# Patient Record
Sex: MALE
[De-identification: names, ages, dates, MRNs, and addresses within clinical notes are randomized per-mention and may not be internally consistent; named-entity substitution may affect disease eponyms.]

---

## 2018-12-27 ENCOUNTER — HOSPITAL ENCOUNTER (EMERGENCY)
Dept: HOSPITAL 14 - H.ER | Age: 28
Discharge: HOME | End: 2018-12-27
Payer: COMMERCIAL

## 2018-12-27 VITALS — HEART RATE: 62 BPM | RESPIRATION RATE: 16 BRPM | DIASTOLIC BLOOD PRESSURE: 72 MMHG | SYSTOLIC BLOOD PRESSURE: 116 MMHG

## 2018-12-27 VITALS — TEMPERATURE: 98.4 F

## 2018-12-27 VITALS — BODY MASS INDEX: 25.7 KG/M2

## 2018-12-27 DIAGNOSIS — B00.9: Primary | ICD-10-CM

## 2018-12-27 NOTE — ED PDOC
HPI: Skin/Bite Injury


Time Seen by Provider: 18 09:09


Chief Complaint (Nursing): Abnormal Skin Integrity


Chief Complaint (Provider): Rash


History Per: Patient


History/Exam Limitations: no limitations


Quality Of Symptoms: Painful, Itching, Draining


Additional Complaint(s): 





Pt reports itchy/painful rash in between buttocks X 2 weeks, feels like a ball 

then opens when he scratches.  States girlfriend was dx with possible herpes 

recently.  Denies fever.





Past Medical History


Reviewed: Nursing Documentation, Vital Signs


Vital Signs: 





                                Last Vital Signs











Temp  98.4 F   18 08:44


 


Pulse  59 L  18 08:44


 


Resp  17   18 08:44


 


BP  113/74   18 08:44


 


Pulse Ox  99   18 08:44














- Medical History


PMH: No Chronic Diseases





- Family History


Family History: States: Unknown Family Hx





- Living Arrangements


Living Arrangements: Alone





- Social History


Current smoker - smoking cessation education provided: No


Alcohol: None





- Immunization History


Hx Tetanus Toxoid Vaccination: No


Hx Influenza Vaccination: No


Hx Pneumococcal Vaccination: No





- Home Medications


Home Medications: 


                                Ambulatory Orders











 Medication  Instructions  Recorded


 


Acyclovir [Zovirax] 400 mg PO TID #21 tab 18














- Allergies


Allergies/Adverse Reactions: 


                                    Allergies











Allergy/AdvReac Type Severity Reaction Status Date / Time


 


No Known Allergies Allergy   Verified 18 09:11














Review of Systems


Constitutional: Negative for: Fever, Chills


Respiratory: Negative for: Cough


Gastrointestinal: Negative for: Abdominal Pain


Genitourinary Male: Positive for: Rash.  Negative for: Dysuria, Frequency, 

Incontinence, Hematuria, Penile Discharge, Scrotal Pain, Penile Pain


Skin: Positive for: Rash, Lesions





Physical Exam





- Reviewed


Nursing Documentation Reviewed: Yes


Vital Signs Reviewed: Yes





- Physical Exam


Appears: Positive for: Well, No Acute Distress


Skin: Positive for: Normal Color, Warm, Dry, Rash ((chaperone: Pasquale RN) 2 small

 abrasions upper middle buttocks, dry, no induration, no TTP, no fluctuance, no 

vesicles, no mass)


Eye Exam: Positive for: Normal appearance, EOMI, PERRL


Lymphatic: Negative for: Adenopathy, Inguinal Node Tenderness


Neurologic/Psych: Positive for: Alert, Oriented





- ECG


O2 Sat by Pulse Oximetry: 99





Medical Decision Making


Medical Decision Makin yo male with rash on buttocks.


- Rx Acyclovir





Disposition





- Clinical Impression


Clinical Impression: 


 Rash








- Disposition


Referrals: 


[facility] [Outside]


Disposition: Routine/Home


Disposition Time: 09:31


Condition: GOOD


Prescriptions: 


Acyclovir [Zovirax] 400 mg PO TID #21 tab


Instructions:  Genital Herpes, Skin Rash


Forms:  [facility] (Qatari)


Print Language: Tamazight

## 2018-12-31 VITALS — OXYGEN SATURATION: 99 %
